# Patient Record
Sex: FEMALE | Race: WHITE | NOT HISPANIC OR LATINO | Employment: FULL TIME | URBAN - METROPOLITAN AREA
[De-identification: names, ages, dates, MRNs, and addresses within clinical notes are randomized per-mention and may not be internally consistent; named-entity substitution may affect disease eponyms.]

---

## 2022-04-10 ENCOUNTER — HISTORICAL (OUTPATIENT)
Dept: ADMINISTRATIVE | Facility: HOSPITAL | Age: 27
End: 2022-04-10

## 2022-04-28 VITALS
DIASTOLIC BLOOD PRESSURE: 60 MMHG | BODY MASS INDEX: 32.92 KG/M2 | WEIGHT: 174.38 LBS | HEIGHT: 61 IN | SYSTOLIC BLOOD PRESSURE: 100 MMHG

## 2022-10-12 ENCOUNTER — OFFICE VISIT (OUTPATIENT)
Dept: OTOLARYNGOLOGY | Facility: CLINIC | Age: 27
End: 2022-10-12
Payer: COMMERCIAL

## 2022-10-12 ENCOUNTER — LAB VISIT (OUTPATIENT)
Dept: LAB | Facility: HOSPITAL | Age: 27
End: 2022-10-12
Attending: OTOLARYNGOLOGY
Payer: COMMERCIAL

## 2022-10-12 VITALS — TEMPERATURE: 99 F | HEART RATE: 96 BPM | SYSTOLIC BLOOD PRESSURE: 123 MMHG | DIASTOLIC BLOOD PRESSURE: 74 MMHG

## 2022-10-12 DIAGNOSIS — J32.4 CHRONIC PANSINUSITIS: ICD-10-CM

## 2022-10-12 DIAGNOSIS — J30.89 NON-SEASONAL ALLERGIC RHINITIS, UNSPECIFIED TRIGGER: ICD-10-CM

## 2022-10-12 DIAGNOSIS — J30.89 NON-SEASONAL ALLERGIC RHINITIS, UNSPECIFIED TRIGGER: Primary | ICD-10-CM

## 2022-10-12 DIAGNOSIS — J34.3 HYPERTROPHY OF INFERIOR NASAL TURBINATE: ICD-10-CM

## 2022-10-12 PROCEDURE — 3074F SYST BP LT 130 MM HG: CPT | Mod: CPTII,S$GLB,, | Performed by: OTOLARYNGOLOGY

## 2022-10-12 PROCEDURE — 1159F PR MEDICATION LIST DOCUMENTED IN MEDICAL RECORD: ICD-10-PCS | Mod: CPTII,S$GLB,, | Performed by: OTOLARYNGOLOGY

## 2022-10-12 PROCEDURE — 99204 PR OFFICE/OUTPT VISIT, NEW, LEVL IV, 45-59 MIN: ICD-10-PCS | Mod: S$GLB,,, | Performed by: OTOLARYNGOLOGY

## 2022-10-12 PROCEDURE — 1159F MED LIST DOCD IN RCRD: CPT | Mod: CPTII,S$GLB,, | Performed by: OTOLARYNGOLOGY

## 2022-10-12 PROCEDURE — 99999 PR PBB SHADOW E&M-EST. PATIENT-LVL III: CPT | Mod: PBBFAC,,, | Performed by: OTOLARYNGOLOGY

## 2022-10-12 PROCEDURE — 3074F PR MOST RECENT SYSTOLIC BLOOD PRESSURE < 130 MM HG: ICD-10-PCS | Mod: CPTII,S$GLB,, | Performed by: OTOLARYNGOLOGY

## 2022-10-12 PROCEDURE — 86003 ALLG SPEC IGE CRUDE XTRC EA: CPT | Performed by: OTOLARYNGOLOGY

## 2022-10-12 PROCEDURE — 3078F DIAST BP <80 MM HG: CPT | Mod: CPTII,S$GLB,, | Performed by: OTOLARYNGOLOGY

## 2022-10-12 PROCEDURE — 86003 ALLG SPEC IGE CRUDE XTRC EA: CPT | Mod: 59 | Performed by: OTOLARYNGOLOGY

## 2022-10-12 PROCEDURE — 99204 OFFICE O/P NEW MOD 45 MIN: CPT | Mod: S$GLB,,, | Performed by: OTOLARYNGOLOGY

## 2022-10-12 PROCEDURE — 99999 PR PBB SHADOW E&M-EST. PATIENT-LVL III: ICD-10-PCS | Mod: PBBFAC,,, | Performed by: OTOLARYNGOLOGY

## 2022-10-12 PROCEDURE — 3078F PR MOST RECENT DIASTOLIC BLOOD PRESSURE < 80 MM HG: ICD-10-PCS | Mod: CPTII,S$GLB,, | Performed by: OTOLARYNGOLOGY

## 2022-10-12 NOTE — PROGRESS NOTES
Referring Provider:    Aaareferral Self  No address on file  Subjective:   Patient: Barrett Wood [id number], :1995   Visit date:10/12/2022 4:21 PM    Chief Complaint:  Sinus Problem (Sinus pressure everywhere in face, going on for years, everyday. Has gotten worse since moved here from [place]. )    HPI:    Prior notes reviewed by myself.  Clinical documentation obtained by nursing staff reviewed.     26 y/o female here for evaluation of chronic sinusitis.  She has nearly constant facial pain/pressure for many years.  She also intermittently has nasal congestion.  She uses fluticasone as well as multiple OTC cough/cold medications, Saline nasal spray, antihistamines without adequate relief.  She was recently seen by an outside ENT and treated with 21 days of antibiotics without adequate relief.  She has tried saline nasal spray and flonase for well over 12 weeks at a time but still has the same symptoms.  No recent imaging or allergy testing.        Objective:     Physical Exam:  Vitals:  /74   Pulse 96   Temp 98.8 °F (37.1 °C) (Temporal)   General appearance:  Well developed, well nourished    Ears:  Otoscopy of external auditory canals and tympanic membranes was normal, clinical speech reception thresholds grossly intact, no mass/lesion of auricle.    Nose:  No masses/lesions of external nose, congested nasal mucosa, septum, and turbinates were hypertrophied 3+.    Mouth:  No mass/lesion of lips, teeth, gums, hard/soft palate, tongue, tonsils, or oropharynx.    Neck & Lymphatics:  No cervical lymphadenopathy, no neck mass/crepitus/ asymmetry, trachea is midline, no thyroid enlargement/tenderness/mass.        [x]  Data Reviewed:    Lab Results   Component Value Date    WBC 14.8 (H) 2017    HGB 13.1 2017    HCT 40.7 2017    MCV 90.0 2017               Assessment & Plan:   Non-seasonal allergic rhinitis, unspecified trigger  -     Aspergillus fumagatus IgE; Future; Expected  date: 10/12/2022  -     Bermuda grass IgE; Future; Expected date: 10/12/2022  -     Cat epithelium IgE; Future; Expected date: 10/12/2022  -     Cladosporium IgE; Future; Expected date: 10/12/2022  -     Cockroach, American IgE; Future; Expected date: 10/12/2022  -     [place], bald IgE; Future; Expected date: 10/12/2022  -     D. farinae IgE; Future; Expected date: 10/12/2022  -     D. pteronyssinus IgE; Future; Expected date: 10/12/2022  -     Dog dander IgE; Future; Expected date: 10/12/2022  -     Plantain, English IgE; Future; Expected date: 10/12/2022  -     Farhan grass IgE; Future; Expected date: 10/12/2022  -     Araiza elder, rough IgE; Future; Expected date: 10/12/2022  -     Mugwort IgE; Future; Expected date: 10/12/2022  -     Nettle IgE; Future; Expected date: 10/12/2022  -     Oak, white IgE; Future; Expected date: 10/12/2022  -     Penicillium IgE; Future; Expected date: 10/12/2022  -     Ragweed, short, common IgE; Future; Expected date: 10/12/2022  -     Fady IgE; Future; Expected date: 10/12/2022  -     Allergen, Cocklebur; Future; Expected date: 10/12/2022  -     Allergen, Elm Cedar; Future; Expected date: 10/12/2022  -     Allergen, Meadow Grass ([facility]); Future; Expected date: 10/12/2022  -     Allergen, Mucor Racemosus; Future; Expected date: 10/12/2022  -     Allergen, Pecan Hickory IgE; Future; Expected date: 10/12/2022  -     Allergen, White Maury; Future; Expected date: 10/12/2022  -     Allergen-Alternaria Alternata; Future; Expected date: 10/12/2022  -     Allergen-[place]; Future; Expected date: 10/12/2022  -     Allergen-Common Pigweed; Future; Expected date: 10/12/2022  -     Allergen-Silver Birch; Future; Expected date: 10/12/2022  -     Feather Panel #2; Future; Expected date: 10/12/2022  -     RAST Allergen for [place]; Future; Expected date: 10/12/2022  -     RAST Allergen Maple ([place]); Future; Expected date: 10/12/2022  -     RAST Allergen [place]; Future;  Expected date: 10/12/2022  -     RAST Allergen, Lamb's Quarters; Future; Expected date: 10/12/2022  -     RAST Allergen, Sheep [place]); Future; Expected date: 10/12/2022    Chronic pansinusitis  -     CT [employer] Sinuses without; Future; Expected date: 10/12/2022      She was seen by an outside ENT and had extensive treatment with antibiotics without resolution of her symptoms.  We agreed to move forward with allergy testing as well as imaging to evaluate her sinuses from a structural standpoint.  She will follow up for results.

## 2022-10-17 LAB
A ALTERNATA IGE QN: <0.1 KU/L
A FUMIGATUS IGE QN: <0.1 KU/L
ALLERGEN BOXELDER MAPLE TREE IGE: <0.1 KU/L
ALLERGEN MAPLE (BOX ELDER) CLASS: NORMAL
ALLERGEN MULBERRY CLASS: NORMAL
ALLERGEN MULBERRY TREE IGE: <0.1 KU/L
ALLERGEN PIGWEED IGE: <0.1 KU/L
ALLERGEN WHITE ASH TREE IGE: <0.1 KU/L
AMER SYCAMORE IGE QN: <0.35 KU/L
BALD CYPRESS IGE QN: <0.1 KU/L
BERMUDA GRASS IGE QN: <0.1 KU/L
C HERBARUM IGE QN: <0.1 KU/L
CAT DANDER IGE QN: <0.1 KU/L
CEDAR IGE QN: <0.1 KU/L
COCKLEBUR IGE QN: <0.1 KU/L
COMMON PIGWEED CLASS: NORMAL
COMMON RAGWEED IGE QN: <0.1 KU/L
D FARINAE IGE QN: 0.11 KU/L
D PTERONYSS IGE QN: 0.17 KU/L
DEPRECATED A ALTERNATA IGE RAST QL: NORMAL
DEPRECATED A FUMIGATUS IGE RAST QL: NORMAL
DEPRECATED BALD CYPRESS IGE RAST QL: NORMAL
DEPRECATED BERMUDA GRASS IGE RAST QL: NORMAL
DEPRECATED C HERBARUM IGE RAST QL: NORMAL
DEPRECATED CAT DANDER IGE RAST QL: NORMAL
DEPRECATED CEDAR IGE RAST QL: NORMAL
DEPRECATED COCKLEBUR IGE RAST QL: NORMAL
DEPRECATED COMMON RAGWEED IGE RAST QL: NORMAL
DEPRECATED D FARINAE IGE RAST QL: ABNORMAL
DEPRECATED D PTERONYSS IGE RAST QL: ABNORMAL
DEPRECATED DOG DANDER IGE RAST QL: NORMAL
DEPRECATED ELDER IGE RAST QL: NORMAL
DEPRECATED ENGL PLANTAIN IGE RAST QL: NORMAL
DEPRECATED GOOSEFOOT IGE RAST QL: NORMAL
DEPRECATED JOHNSON GRASS IGE RAST QL: NORMAL
DEPRECATED KENT BLUE GRASS IGE RAST QL: NORMAL
DEPRECATED M RACEMOSUS IGE RAST QL: NORMAL
DEPRECATED MUGWORT IGE RAST QL: NORMAL
DEPRECATED NETTLE IGE RAST QL: NORMAL
DEPRECATED P NOTATUM IGE RAST QL: NORMAL
DEPRECATED PECAN/HICK TREE IGE RAST QL: NORMAL
DEPRECATED ROACH IGE RAST QL: NORMAL
DEPRECATED SHEEP SORREL IGE RAST QL: NORMAL
DEPRECATED SILVER BIRCH IGE RAST QL: NORMAL
DEPRECATED TIMOTHY IGE RAST QL: NORMAL
DEPRECATED WHITE OAK IGE RAST QL: NORMAL
DOG DANDER IGE QN: <0.1 KU/L
ELDER IGE QN: <0.1 KU/L
ELM CEDAR CLASS: NORMAL
ELM CEDAR, IGE: <0.1 KU/L
ENGL PLANTAIN IGE QN: <0.1 KU/L
FEATHER PANEL #2: <0.35 KU/L
GOOSEFOOT IGE QN: <0.1 KU/L
JOHNSON GRASS IGE QN: <0.1 KU/L
KENT BLUE GRASS IGE QN: <0.1 KU/L
M RACEMOSUS IGE QN: <0.1 KU/L
MUGWORT IGE QN: <0.1 KU/L
NETTLE IGE QN: <0.1 KU/L
P NOTATUM IGE QN: <0.1 KU/L
PECAN/HICK TREE IGE QN: <0.1 KU/L
ROACH IGE QN: <0.1 KU/L
SHEEP SORREL IGE QN: <0.1 KU/L
SILVER BIRCH IGE QN: <0.1 KU/L
TIMOTHY IGE QN: <0.1 KU/L
WHITE ASH CLASS: NORMAL
WHITE OAK IGE QN: <0.1 KU/L

## 2022-10-18 ENCOUNTER — TELEPHONE (OUTPATIENT)
Dept: OTOLARYNGOLOGY | Facility: CLINIC | Age: 27
End: 2022-10-18
Payer: COMMERCIAL

## 2022-10-18 NOTE — TELEPHONE ENCOUNTER
Spoke to patient, let her know that her results were normal from her allergen testing. She told me that she was not going to be able to get the CT recommended by Dr. Cook because it would be too much out of pocket. Asked me to speak to Dr. Cook about other options to see if there is a blockage in her sinuses.

## 2025-03-04 ENCOUNTER — NURSE TRIAGE (OUTPATIENT)
Dept: ADMINISTRATIVE | Facility: CLINIC | Age: 30
End: 2025-03-04
Payer: COMMERCIAL

## 2025-03-04 ENCOUNTER — OCHSNER VIRTUAL EMERGENCY DEPARTMENT (OUTPATIENT)
Facility: CLINIC | Age: 30
End: 2025-03-04
Payer: COMMERCIAL

## 2025-03-04 ENCOUNTER — PATIENT OUTREACH (OUTPATIENT)
Facility: OTHER | Age: 30
End: 2025-03-04
Payer: COMMERCIAL

## 2025-03-04 DIAGNOSIS — N93.9 VAGINAL BLEEDING: Primary | ICD-10-CM

## 2025-03-04 DIAGNOSIS — Z97.5 IUD (INTRAUTERINE DEVICE) IN PLACE: ICD-10-CM

## 2025-03-04 NOTE — TELEPHONE ENCOUNTER
"Patient has an IUD for years with most recent one in place since 10/16/24. Last Saturday she had bright red vaginal bleeding with clots. She does not usually get a period with the IUD in place. Bleeding has continued but not heavy. She is unable to feel the strings but she feels the IUD lower than it should be. She has pain to her lower abdomen. Dispo- see hcp or pcp triage within 4 hours. Unable to schedule within time frame. Sent secure chat to Isidra and chart reviewed by Dr. Rudd. He advised..."it can wait. no emergency unless pain is unbearable or bleeding is severe/uncontrolled .tylenol or motrin for pain " Updated patient and suggested she call her OBGYN when open tomorrow. Instructed to call back with additional questions or worsening of symptoms. Patient verbalized understanding.     Reason for Disposition   [1] Constant abdominal pain AND [2] present > 2 hours    Additional Information   Negative: Shock suspected (e.g., cold/pale/clammy skin, too weak to stand, low BP, rapid pulse)   Negative: Difficult to awaken or acting confused (e.g., disoriented, slurred speech)   Negative: Passed out (e.g., fainted, lost consciousness, blacked out and was not responding)   Negative: Sounds like a life-threatening emergency to the triager   Negative: SEVERE abdominal pain   Negative: SEVERE dizziness (e.g., unable to stand, requires support to walk, feels like passing out now)   Negative: SEVERE vaginal bleeding (e.g., soaking 2 pads or tampons per hour and present 2 or more hours; 1 menstrual cup every 2 hours)   Negative: Patient sounds very sick or weak to the triager   Negative: MODERATE vaginal bleeding (e.g., soaking 1 pad or tampon per hour and present > 6 hours; 1 menstrual cup every 6 hours)    Protocols used: Vaginal Bleeding - [id number]    "

## 2025-03-04 NOTE — TELEPHONE ENCOUNTER
Already speaking with another OOC nurse.   Reason for Disposition   Caller has already spoken with another triager and has no further questions.    Protocols used: No Contact or Duplicate Contact Call-A-AH

## 2025-03-04 NOTE — PROGRESS NOTES
"Patient called and spoke to [facility] RN on call nurse to report the following: "Patient has an IUD for years with most recent one in place since 10/16/24. Last Saturday she had bright red vaginal bleeding with clots. She does not usually get a period with the IUD in place. Bleeding has continued but not heavy. She is unable to feel the strings but she feels the IUD lower than it should be. She has pain to her lower abdomen."    [facility] RN on call nurse consulted with [facility] MD on call, Dr. Henrik Rudd, and disposition was for patient to F/U with OB/GYN. Unable to schedule with [facility] [place] and clinic not open today. Patient will call on 03/05/2025 to schedule an appointment and has also sent a message via patient portal. Follow up scheduled on 03/06/2025 to outreach the patient to ensure she was able to get scheduled and to assess for any additional needs/concerns.     "

## 2025-03-04 NOTE — PLAN OF CARE-OVED
[facility] Plan of Care Note  Referral Source: Nurse On-Call                               Chief Complaint   Patient presents with    Vaginal Bleeding     Patient has an IUD for years with most recent one in place since 10/16/24. Last Saturday she had bright red vaginal bleeding with clots. She does not usually get a period with the IUD in place. Bleeding has continued but not heavy. She is unable to feel the strings but she feels the IUD lower than it should be. She has mild pain to her lower abdomen.       Recommendation: Specialist Referral         Specialty: Obstetrics / Gynecology             Recommendation comment: outside OB/GYN clinic ( [facility])    Can follow up with GYN tomorrow. ER precautions provided, or can call [facility] for other questions/concerns.      Encounter Diagnoses   Name Primary?    Vaginal bleeding Yes    IUD (intrauterine device) in place

## 2025-03-06 ENCOUNTER — PATIENT OUTREACH (OUTPATIENT)
Facility: OTHER | Age: 30
End: 2025-03-06
Payer: COMMERCIAL

## 2025-03-06 NOTE — PROGRESS NOTES
Patient currently at OBGYN clinic appointment.  Will follow up with patient on 3/7/2025 to see if she has any additional questions or concerns.

## 2025-03-07 ENCOUNTER — PATIENT OUTREACH (OUTPATIENT)
Facility: OTHER | Age: 30
End: 2025-03-07
Payer: COMMERCIAL

## 2025-03-08 NOTE — PROGRESS NOTES
Spoke with patient to follow up to see if she had any additional concerns to be addressed.  Patient checked in to OBGYN appointment on 3/6/2025 and voiced no concerns at this time.